# Patient Record
Sex: FEMALE | Race: WHITE | URBAN - METROPOLITAN AREA
[De-identification: names, ages, dates, MRNs, and addresses within clinical notes are randomized per-mention and may not be internally consistent; named-entity substitution may affect disease eponyms.]

---

## 2017-08-04 ENCOUNTER — TRANSFERRED RECORDS (OUTPATIENT)
Dept: HEALTH INFORMATION MANAGEMENT | Facility: CLINIC | Age: 27
End: 2017-08-04

## 2017-09-08 ENCOUNTER — MEDICAL CORRESPONDENCE (OUTPATIENT)
Dept: HEALTH INFORMATION MANAGEMENT | Facility: CLINIC | Age: 27
End: 2017-09-08

## 2017-11-02 ENCOUNTER — OFFICE VISIT (OUTPATIENT)
Dept: OPHTHALMOLOGY | Facility: CLINIC | Age: 27
End: 2017-11-02
Attending: OPHTHALMOLOGY
Payer: COMMERCIAL

## 2017-11-02 DIAGNOSIS — H27.03 APHAKIA OF BOTH EYES: ICD-10-CM

## 2017-11-02 DIAGNOSIS — H40.53X2 SECONDARY GLAUCOMA OF BOTH EYES, MODERATE STAGE: Primary | ICD-10-CM

## 2017-11-02 DIAGNOSIS — J45.20 INTERMITTENT ASTHMA, UNSPECIFIED ASTHMA SEVERITY, UNSPECIFIED WHETHER COMPLICATED: ICD-10-CM

## 2017-11-02 DIAGNOSIS — H40.9 GLAUCOMA: ICD-10-CM

## 2017-11-02 DIAGNOSIS — H55.01 CONGENITAL NYSTAGMUS: ICD-10-CM

## 2017-11-02 PROCEDURE — 92083 EXTENDED VISUAL FIELD XM: CPT | Mod: ZF | Performed by: OPHTHALMOLOGY

## 2017-11-02 PROCEDURE — 99213 OFFICE O/P EST LOW 20 MIN: CPT | Mod: ZF

## 2017-11-02 RX ORDER — TIMOLOL MALEATE 2.5 MG/ML
1 SOLUTION/ DROPS OPHTHALMIC 2 TIMES DAILY
COMMUNITY
End: 2020-07-03

## 2017-11-02 RX ORDER — FLUTICASONE PROPIONATE 50 MCG
1 SPRAY, SUSPENSION (ML) NASAL DAILY
COMMUNITY

## 2017-11-02 RX ORDER — LATANOPROST 50 UG/ML
1 SOLUTION/ DROPS OPHTHALMIC AT BEDTIME
COMMUNITY

## 2017-11-02 ASSESSMENT — PACHYMETRY
OD_CT(UM): 770
OS_CT(UM): 795

## 2017-11-02 ASSESSMENT — TONOMETRY
OD_IOP_MMHG: 26
OD_IOP_MMHG: 26
IOP_METHOD: TONOPEN
OS_IOP_MMHG: 22
OS_IOP_MMHG: 24
IOP_METHOD: APPLANATION

## 2017-11-02 ASSESSMENT — CUP TO DISC RATIO
OD_RATIO: 0.8
OS_RATIO: 0.7

## 2017-11-02 ASSESSMENT — VISUAL ACUITY
CORRECTION_TYPE: CONTACTS
OS_CC: 20/60
OD_CC: 20/80
OS_CC+: -1
METHOD: SNELLEN - LINEAR

## 2017-11-02 ASSESSMENT — CONF VISUAL FIELD
OD_NORMAL: 1
METHOD: COUNTING FINGERS
OS_NORMAL: 1

## 2017-11-02 ASSESSMENT — SLIT LAMP EXAM - LIDS
COMMENTS: NORMAL
COMMENTS: NORMAL

## 2017-11-02 NOTE — NURSING NOTE
Chief Complaints and History of Present Illnesses   Patient presents with     Consult For     HPI    Affected eye(s):  Both   Symptoms:        Frequency:  Constant       Do you have eye pain now?:  No      Comments:  They have been unable to control the pressures avg 40 RE  +dry   Chuyita Landis COT 12:29 PM November 2, 2017

## 2017-11-02 NOTE — MR AVS SNAPSHOT
After Visit Summary   11/2/2017    Tami Allen    MRN: [id number]           Patient Information     Date Of Birth          1990        Visit Information        Provider Department      11/2/2017 12:30 PM Scarlet Navarrete MD [facility]        Today's Diagnoses     Secondary glaucoma of both eyes, moderate stage    -  1    Glaucoma        Aphakia of both eyes        Congenital nystagmus          Care Instructions    Patient will continue on Latanoprost which is a teal top drop at bedtime in BOTH EYES and Simbrinza (Brinzolamide/Brimonidine) which is a white top drop 2x/day (12 hours apart) in BOTH EYES.  A long discussion of the risks, benefits, and alternatives including potential treatment and management options were had with patient and a decision was made to obtain an evaluation with a retina specialist and recheck the IOP prior to proceeding with glaucoma surgery in the right eye.  Patient will also follow up with a Pulmonologist prior to her follow up appointment.  Patient will return to clinic in 3-4 weeks with repeat IOP check.           Follow-ups after your visit        Follow-up notes from your care team     Return 3-4 weeks with repeat IOP check. .      Future tests that were ordered for you today     Open Future Orders        Priority Expected Expires Ordered    DILATED FUNDUS EXAM Routine  11/2/2018 11/2/2017    Pachymetry OU (both eyes) Routine  5/2/2019 11/2/2017            Who to contact     Please call your clinic at [phone] to:    Ask questions about your health    Make or cancel appointments    Discuss your medicines    Learn about your test results    Speak to your doctor   If you have compliments or concerns about an experience at your clinic, or if you wish to file a complaint, please contact [facility] Patient Relations at [phone] or email us at [email or web address]         Additional Information About Your Visit         GelSight Information     GelSight is an electronic gateway that provides easy, online access to your medical records. With GelSight, you can request a clinic appointment, read your test results, renew a prescription or communicate with your care team.     To sign up for GelSight visit the website at [email or web address]   You will be asked to enter the access code listed below, as well as some personal information. Please follow the directions to create your username and password.     Your access code is: [email or web address]  Expires: 2018  6:30 AM     Your access code will  in 90 days. If you need help or a new code, please contact your [facility] or call [phone] for assistance.        Care EveryWhere ID     This is your Care EveryWhere ID. This could be used by other organizations to access your [place] medical records  [id number]         Blood Pressure from Last 3 Encounters:   No data found for BP    Weight from Last 3 Encounters:   No data found for Wt              We Performed the Following     Low Vision Central OU        Primary Care Provider    None Specified       No primary provider on file.        Equal Access to Services     [facility]: Hadii mohan Pappas, minerva hill, qavladislav enciso, estrella lucia . So [facility] [phone].    ATENCIÓN: Si habla español, tiene a stearns disposición servicios gratuitos de asistencia lingüística. Llame al [phone].    We comply with applicable federal civil rights laws and [place] laws. We do not discriminate on the basis of race, color, national origin, age, disability, sex, sexual orientation, or gender identity.            Thank you!     Thank you for choosing EYE CLINIC  for your care. Our goal is always to provide you with excellent care. Hearing back from our patients is one way we can continue to improve our services. Please take a few minutes to complete the  written survey that you may receive in the mail after your visit with us. Thank you!             Your Updated Medication List - Protect others around you: Learn how to safely use, store and throw away your medicines at www.disposemymeds.org.          This list is accurate as of: 11/2/17  1:55 PM.  Always use your most recent med list.                   Brand Name Dispense Instructions for use Diagnosis    brinzolamide-brimonidine 1-0.2 % ophthalmic suspension    SIMBRINZA     1 drop 3 times daily        FLOVENT HFA IN           fluticasone 50 MCG/ACT spray    FLONASE     Spray 1 spray into both nostrils daily        latanoprost 0.005 % ophthalmic solution    XALATAN     1 drop At Bedtime    Secondary glaucoma of both eyes, moderate stage       timolol 0.25 % ophthalmic solution    TIMOPTIC     1 drop 2 times daily

## 2017-11-02 NOTE — PROGRESS NOTES
1)Aphakic/Secondary Glaucoma -- started on gtts in mid 1990s -- K pachy:770/795    Tmax: 38/19    HVF: OD:IN dec sens and OS:Fluct     CDR: 0.8/0.7    HRT/OCT: Unreliable       FHX of Glc: Brother, Sister -- Aphakic Glc -- on gtts, sister had tube surgery, grandmother -- possibly     Gonio:       Intolerant to: T1/2 -- SOB per old notes, asthma attacks per patient     Asthma/COPD: Yes, on inhalers UNABLE to tolerate T1/2   Steroid Use: Yes, po pred     Kidney Stones: No    Sulfa Allergy: No     IOP targets: -- does better with SS5 (LVC) visual field tests -- IOPs uncontrolled OD since April 2017 per patient -- IOP ok on T1/2  -- ?need for PPV at time of tube  2)Aphakia -- s/p CE for congenital cataracts -- wears contacts  3)Nystagmus -- has been wearing contacts since 6 weeks old  4)SHANNON    MD:was tolerating T1/2 in past but notices worsening breathing while on it    Patient will continue on Latanoprost which is a teal top drop at bedtime in BOTH EYES and Simbrinza (Brinzolamide/Brimonidine) which is a white top drop 2x/day (12 hours apart) in BOTH EYES.  A long discussion of the risks, benefits, and alternatives including potential treatment and management options were had with patient and a decision was made to obtain an evaluation with a retina specialist and recheck the IOP prior to proceeding with glaucoma surgery in the right eye.  Patient will also follow up with a Pulmonologist prior to her follow up appointment.  Patient will return to clinic in 3-4 weeks with repeat IOP check.     Attending Physician Attestation:  Complete documentation of historical and exam elements from today's encounter can be found in the full encounter summary report (not reduplicated in this progress note). I personally obtained the chief complaint(s) and history of present illness.  I confirmed and edited as necessary the review of systems, past medical/surgical history, family history, social history, and examination findings as  documented by others; and I examined the patient myself. I personally reviewed the relevant tests, images, and reports as documented above. I formulated and edited as necessary the assessment and plan and discussed the findings and management plan with the patient and family.  - Scarlet Navarrete MD

## 2017-11-02 NOTE — PATIENT INSTRUCTIONS
Patient will continue on Latanoprost which is a teal top drop at bedtime in BOTH EYES and Simbrinza (Brinzolamide/Brimonidine) which is a white top drop 2x/day (12 hours apart) in BOTH EYES.  A long discussion of the risks, benefits, and alternatives including potential treatment and management options were had with patient and a decision was made to obtain an evaluation with a retina specialist and recheck the IOP prior to proceeding with glaucoma surgery in the right eye.  Patient will also follow up with a Pulmonologist prior to her follow up appointment.  Patient will return to clinic in 3-4 weeks with repeat IOP check.

## 2017-11-15 DIAGNOSIS — J45.909 UNCOMPLICATED ASTHMA, UNSPECIFIED ASTHMA SEVERITY, UNSPECIFIED WHETHER PERSISTENT: Primary | ICD-10-CM

## 2017-11-30 ENCOUNTER — OFFICE VISIT (OUTPATIENT)
Dept: OPHTHALMOLOGY | Facility: CLINIC | Age: 27
End: 2017-11-30
Attending: OPHTHALMOLOGY
Payer: COMMERCIAL

## 2017-11-30 DIAGNOSIS — H27.03 APHAKIA OF BOTH EYES: ICD-10-CM

## 2017-11-30 DIAGNOSIS — H55.01 CONGENITAL NYSTAGMUS: ICD-10-CM

## 2017-11-30 DIAGNOSIS — H40.53X2 SECONDARY GLAUCOMA OF BOTH EYES, MODERATE STAGE: Primary | ICD-10-CM

## 2017-11-30 PROCEDURE — 99212 OFFICE O/P EST SF 10 MIN: CPT | Mod: ZF

## 2017-11-30 RX ORDER — METHAZOLAMIDE 50 MG/1
50 TABLET ORAL 3 TIMES DAILY
Qty: 90 TABLET | Refills: 3 | Status: SHIPPED | OUTPATIENT
Start: 2017-11-30

## 2017-11-30 ASSESSMENT — CONF VISUAL FIELD
OD_NORMAL: 1
OS_NORMAL: 1

## 2017-11-30 ASSESSMENT — VISUAL ACUITY
CORRECTION_TYPE: CONTACTS
OD_SC: 20/80
METHOD: SNELLEN - LINEAR
OS_SC: 20/70
OS_SC+: +1

## 2017-11-30 ASSESSMENT — SLIT LAMP EXAM - LIDS
COMMENTS: NORMAL
COMMENTS: NORMAL

## 2017-11-30 ASSESSMENT — TONOMETRY
OS_IOP_MMHG: 24
OD_IOP_MMHG: 36
OD_IOP_MMHG: 35
IOP_METHOD: APPLANATION
OS_IOP_MMHG: 14
IOP_METHOD: TONOPEN

## 2017-11-30 ASSESSMENT — CUP TO DISC RATIO
OD_RATIO: 0.8
OS_RATIO: 0.7

## 2017-11-30 NOTE — PATIENT INSTRUCTIONS
Patient will continue on Latanoprost which is a teal top drop at bedtime in BOTH EYES and Simbrinza (Brinzolamide/Brimonidine) which is a white top drop 2x/day (12 hours apart) in BOTH EYES.  A long discussion of the risks, benefits, and alternatives including potential treatment and management options were had with patient and a decision was made to trial Methazolamide 50mg pills 3x/day while thinking about treatment options. Patient will also follow up with her Pulmonologist and think about options prior to considering tube surgery in the right eye.   Patient will also follow up with a Pulmonologist prior to her follow up appointment.  Patient will return to clinic in 3-4 weeks with repeat IOP check.

## 2017-11-30 NOTE — MR AVS SNAPSHOT
After Visit Summary   11/30/2017    Tami Allen    MRN: [id number]           Patient Information     Date Of Birth          1990        Visit Information        Provider Department      11/30/2017 3:00 PM Scarlet Navarrete MD [facility]        Today's Diagnoses     Secondary glaucoma of both eyes, moderate stage    -  1    Congenital nystagmus        Aphakia of both eyes          Care Instructions    Patient will continue on Latanoprost which is a teal top drop at bedtime in BOTH EYES and Simbrinza (Brinzolamide/Brimonidine) which is a white top drop 2x/day (12 hours apart) in BOTH EYES.  A long discussion of the risks, benefits, and alternatives including potential treatment and management options were had with patient and a decision was made to trial Methazolamide 50mg pills 3x/day while thinking about treatment options. Patient will also follow up with her Pulmonologist and think about options prior to considering tube surgery in the right eye.   Patient will also follow up with a Pulmonologist prior to her follow up appointment.  Patient will return to clinic in 3-4 weeks with repeat IOP check.           Follow-ups after your visit        Follow-up notes from your care team     Return 3-4 weeks with repeat IOP check. .      Your next 10 appointments already scheduled     Jan 04, 2018  2:15 PM CST   RETURN GLAUCOMA with Scarlet Navarrete MD   Eye Clinic ([facility])    [facility] Wil[facility] [address]  [address]  [facility] [id number] [phone]              Who to contact     Please call your clinic at [phone] to:    Ask questions about your health    Make or cancel appointments    Discuss your medicines    Learn about your test results    Speak to your doctor   If you have compliments or concerns about an experience at your clinic, or if you wish to file a complaint, please contact [facility] Patient Relations at [phone]  or email us at [email or web address]         Additional Information About Your Visit        [employer]cal [employer]ric[employer]ge Information     Kidizen is an electronic gateway that provides easy, online access to your medical records. With Kidizen, you can request a clinic appointment, read your test results, renew a prescription or communicate with your care team.     To sign up for Kidizen visit the website at [email or web address]   You will be asked to enter the access code listed below, as well as some personal information. Please follow the directions to create your username and password.     Your access code is: [email or web address]  Expires: 2018  5:30 AM     Your access code will  in 90 days. If you need help or a new code, please contact your [facility] or call [phone] for assistance.        Care EveryWhere ID     This is your Care EveryWhere ID. This could be used by other organizations to access your [place] medical records  [id number]         Blood Pressure from Last 3 Encounters:   No data found for BP    Weight from Last 3 Encounters:   No data found for Wt              Today, you had the following     No orders found for display         Today's Medication Changes          These changes are accurate as of: 17  4:35 PM.  If you have any questions, ask your nurse or doctor.               Start taking these medicines.        Dose/Directions    methazolamide 50 MG tablet   Commonly known as:  NEPTAZANE   Used for:  Secondary glaucoma of both eyes, moderate stage   Started by:  Scarlet Navarrete MD        Dose:  50 mg   Take 1 tablet (50 mg) by mouth 3 times daily   Quantity:  90 tablet   Refills:  3            Where to get your medicines      These medications were sent to [facility] [address] [place] [address] [address] [facility]  [address] [facility] [address]     Phone:  [phone]     methazolamide 50 MG tablet                Primary Care Provider     None Specified       No primary provider on file.        Equal Access to Services     DARINEL HORTON : Hadii aad ku hadcristalcarolina Pappas, mariahkhalif hill, daronchantale purimaestrella coronado. So [facility] [phone].    ATENCIÓN: Si habla español, tiene a stearns disposición servicios gratuitos de asistencia lingüística. [facility] [phone].    We comply with applicable federal civil rights laws and [place] laws. We do not discriminate on the basis of race, color, national origin, age, disability, sex, sexual orientation, or gender identity.            Thank you!     Thank you for choosing EYE CLINIC  for your care. Our goal is always to provide you with excellent care. Hearing back from our patients is one way we can continue to improve our services. Please take a few minutes to complete the written survey that you may receive in the mail after your visit with us. Thank you!             Your Updated Medication List - Protect others around you: Learn how to safely use, store and throw away your medicines at www.disposemymeds.org.          This list is accurate as of: 11/30/17  4:35 PM.  Always use your most recent med list.                   Brand Name Dispense Instructions for use Diagnosis    brinzolamide-brimonidine 1-0.2 % ophthalmic suspension    SIMBRINZA     1 drop 3 times daily        FLOVENT HFA IN           fluticasone 50 MCG/ACT spray    FLONASE     Spray 1 spray into both nostrils daily        latanoprost 0.005 % ophthalmic solution    XALATAN     1 drop At Bedtime    Secondary glaucoma of both eyes, moderate stage       methazolamide 50 MG tablet    NEPTAZANE    90 tablet    Take 1 tablet (50 mg) by mouth 3 times daily    Secondary glaucoma of both eyes, moderate stage       timolol 0.25 % ophthalmic solution    TIMOPTIC     1 drop 2 times daily

## 2017-11-30 NOTE — NURSING NOTE
Chief Complaints and History of Present Illnesses   Patient presents with     Follow Up For     4 week f/u IOP check     HPI    Affected eye(s):  Both   Symptoms:     No blurred vision   No floaters   No flashes   No tearing   No Dryness         Do you have eye pain now?:  No      Comments:  Simbrinza BID OU last used this morning at 630  Latanoprost QHS OU last used 1015 yesterday evening    Xenia Huitron November 30, 2017 3:15 PM

## 2017-11-30 NOTE — PROGRESS NOTES
1)Aphakic/Secondary Glaucoma -- started on gtts in mid 1990s -- K pachy:770/795    Tmax: 38/19    HVF: OD:IN dec sens and OS:Fluct     CDR: 0.8/0.7    HRT/OCT: Unreliable       FHX of Glc: Brother, Sister -- Aphakic Glc -- on gtts, sister had tube surgery, grandmother -- possibly     Gonio:       Intolerant to: T1/2 -- SOB per old notes, asthma attacks per patient (is following with Pulm at ), Diamox and Methazolamide -- fatigue (12/17)     Asthma/COPD: Yes, on inhalers (Phone call: Ok to use selective BB  Per Pulm at  prior to considering incisional surgery )   Steroid Use: Yes, po pred     Kidney Stones: No    Sulfa Allergy: No     IOP targets: -- does better with SS5 (LVC) visual field tests -- IOPs uncontrolled OD since April 2017 per patient -- IOP ok on T1/2  -- ?need for PPV at time of tube  2)Aphakia -- s/p CE for congenital cataracts -- wears contacts  3)Nystagmus -- has been wearing contacts since 6 weeks old  4)SHANNON    MD:was tolerating T1/2 in past with good IOPs but notices worsening breathing while on it    MD:Will need to discuss use of T1/2 with Pulm prior to use -- could consider Betaxolol    MD:Phone call (Dec 2017) had with patient's Pulmonologist who felt selective beta blocker use was low risk given her severity of asthma and felt that would be a safe enough option prior to considering incisional surgery        Patient will continue on Latanoprost which is a teal top drop at bedtime in BOTH EYES and Simbrinza (Brinzolamide/Brimonidine) which is a white top drop 2x/day (12 hours apart) in BOTH EYES.  A long discussion of the risks, benefits, and alternatives including potential treatment and management options were had with patient and a decision was made to trial Methazolamide 50mg pills 3x/day while thinking about treatment options. Patient will also follow up with her Pulmonologist and think about options prior to considering tube surgery in the right eye.   Patient will also follow up with  a Pulmonologist prior to her follow up appointment.  Patient will return to clinic in 3-4 weeks with repeat IOP check.     Attending Physician Attestation:  Complete documentation of historical and exam elements from today's encounter can be found in the full encounter summary report (not reduplicated in this progress note). I personally obtained the chief complaint(s) and history of present illness.  I confirmed and edited as necessary the review of systems, past medical/surgical history, family history, social history, and examination findings as documented by others; and I examined the patient myself. I personally reviewed the relevant tests, images, and reports as documented above. I formulated and edited as necessary the assessment and plan and discussed the findings and management plan with the patient and family.  - Scarlet Navarrete MD     .

## 2017-12-13 ENCOUNTER — TELEPHONE (OUTPATIENT)
Dept: OPHTHALMOLOGY | Facility: CLINIC | Age: 27
End: 2017-12-13

## 2017-12-13 NOTE — TELEPHONE ENCOUNTER
----- Message from Amparo Evans sent at 12/13/2017  1:19 PM CST -----  Regarding: SIde Effects of Med - Dr Navarrete  Contact: [phone]  The pt was prescribed methazolamide (NEPTAZANE) 50 MG tablet and is having side effects from taking it. She states it made her tired, she couldn't walk 4 blocks, etc.  Please call the pt at [phone] and if possible in the next hour, as it is nap time for the kids so she can talk.    Thanks - Amparo    Please DO NOT send this message and/or reply back to sender.  Call Center Representatives DO NOT respond to messages.

## 2017-12-13 NOTE — TELEPHONE ENCOUNTER
Pt states having intolerances to methazolamide as stated in message attached from call center    Left message with direct triage number at [phone] to review further    Note to glaucoma resident for review   Corey Brock RN 3:43 PM 12/13/17

## 2017-12-15 NOTE — TELEPHONE ENCOUNTER
Dr. Camara reviewed and would like to see pt next Thursday with dr. navarrete    Only Dr. Navarrete clinic day until new year    Pt states working and really short staffed would like to decline appt and keep appt first week of new year    Note to dr. Jax Brock RN 3:37 PM 12/15/17

## 2017-12-15 NOTE — TELEPHONE ENCOUNTER
Spoke to pt telephone 12-15-17 at [phone]  States stopped the methazolamide few days ago    Pt states vision was good on it, but body was became really fatigued-- could only work about 5/day then had to sleep  Pt had intolerances to diamox in past also    I previously reviewed with dr. Camara and ok to stop if intolerant    Pt has appt in January    Will forward to dr. Camara to review plan of care with pt     NOTE:  Please call after 1 PM per pt request-- hard to take calls at work     Corey Brock RN 10:19 AM 12/15/17

## 2017-12-22 ENCOUNTER — TELEPHONE (OUTPATIENT)
Dept: OPHTHALMOLOGY | Facility: CLINIC | Age: 27
End: 2017-12-22

## 2017-12-22 NOTE — TELEPHONE ENCOUNTER
----- Message from Reji Loving sent at 12/22/2017  1:05 PM CST -----  Regarding: XALATAN and NEPTAZANE scripts not working for pt  Contact: [phone]  Pt called stating the methazolamide (NEPTAZANE) 50 MG tablet and latanoprost (XALATAN) 0.005 % ophthalmic solution prescriptions are not working for her. Pt's brother switched to TRAVATAN from XALATAN and she said it works better for him so she is wondering if she can switch to that as well. She also stated she stopped taking NEPTAZANE, so if there is any alternative please let pt know. Contact pt at [phone] to discuss these.    Thank you,    Reji Loving  Call Center    Please DO NOT send this message and/or reply back to sender. Call Center Representatives DO NOT respond to messages.

## 2017-12-22 NOTE — TELEPHONE ENCOUNTER
Pt states her brother had better response on travatan than xalatan   If current regimen not working well is it reasonable to try the travatan to see if also has better results (pt aware same class)    Pt aware dr. Navarrete out next week and will forward to Dr. Camara for review and aware not in this Friday afternoon and may be next tues after holiday when able to respond    Pt still off methazolamide since mid december-- I did offer pressure check if would like next week and pt states will keep January 4th appt     Corey Brock RN 1:30 PM 12/22/17

## 2018-01-04 ENCOUNTER — OFFICE VISIT (OUTPATIENT)
Dept: OPHTHALMOLOGY | Facility: CLINIC | Age: 28
End: 2018-01-04
Attending: OPHTHALMOLOGY
Payer: COMMERCIAL

## 2018-01-04 DIAGNOSIS — H55.01 CONGENITAL NYSTAGMUS: ICD-10-CM

## 2018-01-04 DIAGNOSIS — H27.03 APHAKIA OF BOTH EYES: ICD-10-CM

## 2018-01-04 DIAGNOSIS — H40.53X2 SECONDARY GLAUCOMA OF BOTH EYES, MODERATE STAGE: Primary | ICD-10-CM

## 2018-01-04 PROCEDURE — G0463 HOSPITAL OUTPT CLINIC VISIT: HCPCS | Mod: ZF

## 2018-01-04 RX ORDER — BETAXOLOL HYDROCHLORIDE 5 MG/ML
1 SOLUTION/ DROPS OPHTHALMIC 2 TIMES DAILY
Status: DISCONTINUED | OUTPATIENT
Start: 2018-01-04 | End: 2018-01-05

## 2018-01-04 ASSESSMENT — TONOMETRY
IOP_METHOD: APPLANATION
OD_IOP_MMHG: 16
OD_IOP_MMHG: 23
OS_IOP_MMHG: 14
IOP_METHOD: APPLANATION
OS_IOP_MMHG: 18
IOP_METHOD: TONOPEN
OD_IOP_MMHG: 23
OS_IOP_MMHG: 17

## 2018-01-04 ASSESSMENT — VISUAL ACUITY
OS_CC+: -2
METHOD: SNELLEN - LINEAR
CORRECTION_TYPE: CONTACTS
OD_CC+: -1
OS_CC: 20/60
OD_CC: 20/70

## 2018-01-04 ASSESSMENT — CONF VISUAL FIELD
OD_INFERIOR_NASAL_RESTRICTION: 3
OD_INFERIOR_TEMPORAL_RESTRICTION: 3

## 2018-01-04 ASSESSMENT — SLIT LAMP EXAM - LIDS
COMMENTS: NORMAL
COMMENTS: NORMAL

## 2018-01-04 NOTE — PATIENT INSTRUCTIONS
Patient will continue on Latanoprost which is a teal top drop at bedtime in BOTH EYES and Simbrinza (Brinzolamide/Brimonidine) which is a white top drop 2x/day (12 hours apart) in BOTH EYES.  A long discussion of the risks, benefits, and alternatives including potential treatment and management options were had with patient and a decision was made to trial Betaxolol in the morning in the RIGHT EYE ONLY.  Patient will also continue to follow up with her Pulmonologist.  Patient will return to clinic in 1-2 months with repeat IOP check.

## 2018-01-04 NOTE — PROGRESS NOTES
1)Aphakic/Secondary Glaucoma -- started on gtts in mid 1990s -- K pachy:770/795    Tmax: 38/L20s    HVF: OD:IN dec sens and OS:Fluct     CDR: 0.8/0.7    HRT/OCT: Unreliable       FHX of Glc: Brother, Sister -- Aphakic Glc -- on gtts, sister had tube surgery, grandmother -- possibly     Gonio:       Intolerant to: T1/2 -- SOB per old notes, asthma attacks per patient (is following with Pulm at ), Diamox and Methazolamide -- fatigue (12/17)     Asthma/COPD: Yes, on inhalers (Phone call: Ok to use selective BB  Per Pulm at  prior to considering incisional surgery )   Steroid Use: Yes, po pred     Kidney Stones: No    Sulfa Allergy: No     IOP targets: -- does better with SS5 (LVC) visual field tests -- IOPs uncontrolled OD since April 2017 per patient -- IOP ok on T1/2  -- ?need for PPV at time of tube  2)Aphakia -- s/p CE for congenital cataracts -- wears contacts  3)Nystagmus -- has been wearing contacts since 6 weeks old  4)SHANNON    MD:was tolerating T1/2 in past with good IOPs but notices worsening breathing while on it    MD:Will need to discuss use of T1/2 with Pulm prior to use -- could consider Betaxolol    MD:Phone call (Dec 2017) had with patient's Pulmonologist who felt selective beta blocker use was low risk given her severity of asthma and felt that would be a safe enough option prior to considering incisional surgery    MD:1/18 visit:  IOP improved as pt self started T1/2        Patient will continue on Latanoprost which is a teal top drop at bedtime in BOTH EYES and Simbrinza (Brinzolamide/Brimonidine) which is a white top drop 2x/day (12 hours apart) in BOTH EYES.  A long discussion of the risks, benefits, and alternatives including potential treatment and management options were had with patient and a decision was made to trial Betaxolol in the morning in the RIGHT EYE ONLY.  Patient will also continue to follow up with her Pulmonologist.  Patient will return to clinic in 1-2 months with repeat IOP  check.     Attending Physician Attestation:  Complete documentation of historical and exam elements from today's encounter can be found in the full encounter summary report (not reduplicated in this progress note). I personally obtained the chief complaint(s) and history of present illness.  I confirmed and edited as necessary the review of systems, past medical/surgical history, family history, social history, and examination findings as documented by others; and I examined the patient myself. I personally reviewed the relevant tests, images, and reports as documented above. I formulated and edited as necessary the assessment and plan and discussed the findings and management plan with the patient and family.  - Scarlet Navarrete MD       Resident note  Did not tolerate MZM-extreme fatigue and shortness of breath. Currently on timoptic, xalatan and simbrinza both eyes. She denies any breathing issues currently.   Patient refusing incisional surgery.   mCPC may be an option given patient's reticence to incisional surgery.   Would continue timoptic, xalatan and simbrinza both eyes for now.   Return to clinic 2 months repeat [facility] visual field both eyes     Homero Camara MD  PGY3, Dept of Ophthalmology  Pager [phone]

## 2018-01-04 NOTE — NURSING NOTE
Chief Complaints and History of Present Illnesses   Patient presents with     Follow Up For     Secondary glaucoma of both eyes, moderate stage      HPI    Last Eye Exam:  11/30/18   Affected eye(s):  Both   Symptoms:        Unknown duration    Frequency:  Constant       Do you have eye pain now?:  No      Comments:  Tami is here today for a follow up of Secondary glaucoma of both eyes, moderate stage   She feels her vision is a little better than at last visit .  She says her eyes are dry RE>LE.     Danielito Rodrigez COT 2:42 PM January 4, 2018

## 2018-01-04 NOTE — MR AVS SNAPSHOT
After Visit Summary   1/4/2018    Tami Allen    MRN: [id number]           Patient Information     Date Of Birth          1990        Visit Information        Provider Department      1/4/2018 2:15 PM Scarlet Navarrete MD [facility]        Today's Diagnoses     Secondary glaucoma of both eyes, moderate stage    -  1    Aphakia of both eyes        Congenital nystagmus          Care Instructions    Patient will continue on Latanoprost which is a teal top drop at bedtime in BOTH EYES and Simbrinza (Brinzolamide/Brimonidine) which is a white top drop 2x/day (12 hours apart) in BOTH EYES.  A long discussion of the risks, benefits, and alternatives including potential treatment and management options were had with patient and a decision was made to trial Betaxolol in the morning in the RIGHT EYE ONLY.  Patient will also continue to follow up with her Pulmonologist.  Patient will return to clinic in 1-2 months with repeat IOP check.           Follow-ups after your visit        Follow-up notes from your care team     Return 1-2 months with repeat IOP check. .      Your next 10 appointments already scheduled     Mar 06, 2018  1:45 PM CST   RETURN GLAUCOMA with Scarlet Navarrete MD   Eye Clinic ([facility])    [address]  [address]  [address]  [facility] [id number] [phone]              Who to contact     Please call your clinic at [phone] to:    Ask questions about your health    Make or cancel appointments    Discuss your medicines    Learn about your test results    Speak to your doctor   If you have compliments or concerns about an experience at your clinic, or if you wish to file a complaint, please contact [facility] Patient Relations at [phone] or email us at [email or web address]         Additional Information About Your Visit        Explorrahart Information     [employer] is an electronic gateway that provides  easy, online access to your medical records. With [employer], you can request a clinic appointment, read your test results, renew a prescription or communicate with your care team.     To sign up for [employer] visit the website at [email or web address]   You will be asked to enter the access code listed below, as well as some personal information. Please follow the directions to create your username and password.     Your access code is: [email or web address]  Expires: 2018  5:30 AM     Your access code will  in 90 days. If you need help or a new code, please contact your [facility] or call [phone] for assistance.        Care EveryWhere ID     This is your Care EveryWhere ID. This could be used by other organizations to access your [facility] medical records  [id number]         Blood Pressure from Last 3 Encounters:   No data found for BP    Weight from Last 3 Encounters:   No data found for Wt              Today, you had the following     No orders found for display       Primary Care Provider    None Specified       No primary provider on file.        Equal Access to Services     [facility] HospitalELIEZER : Hadii mohan carrillo hadcristalo Sotaylor, waaxda luqadaha, qaybta kaalmakhalif enciso, estrella lucia . So [facility] [phone].    ATENCIÓN: Si habla español, tiene a stearns disposición servicios gratuitos de asistencia lingüística. Llame al [phone].    We comply with applicable federal civil rights laws and [place] laws. We do not discriminate on the basis of race, color, national origin, age, disability, sex, sexual orientation, or gender identity.            Thank you!     Thank you for choosing EYE CLINIC  for your care. Our goal is always to provide you with excellent care. Hearing back from our patients is one way we can continue to improve our services. Please take a few minutes to complete the written survey that you may receive in the mail after your visit with  us. Thank you!             Your Updated Medication List - Protect others around you: Learn how to safely use, store and throw away your medicines at www.disposemymeds.org.          This list is accurate as of: 1/4/18  4:58 PM.  Always use your most recent med list.                   Brand Name Dispense Instructions for use Diagnosis    brinzolamide-brimonidine 1-0.2 % ophthalmic suspension    SIMBRINZA     1 drop 3 times daily        FLOVENT HFA IN           fluticasone 50 MCG/ACT spray    FLONASE     Spray 1 spray into both nostrils daily        latanoprost 0.005 % ophthalmic solution    XALATAN     1 drop At Bedtime    Secondary glaucoma of both eyes, moderate stage       methazolamide 50 MG tablet    NEPTAZANE    90 tablet    Take 1 tablet (50 mg) by mouth 3 times daily    Secondary glaucoma of both eyes, moderate stage       timolol 0.25 % ophthalmic solution    TIMOPTIC     1 drop 2 times daily

## 2018-03-06 ENCOUNTER — OFFICE VISIT (OUTPATIENT)
Dept: OPHTHALMOLOGY | Facility: CLINIC | Age: 28
End: 2018-03-06
Attending: OPHTHALMOLOGY

## 2018-03-06 DIAGNOSIS — H27.03 APHAKIA OF BOTH EYES: ICD-10-CM

## 2018-03-06 DIAGNOSIS — H40.53X2 SECONDARY GLAUCOMA OF BOTH EYES, MODERATE STAGE: Primary | ICD-10-CM

## 2018-03-06 PROCEDURE — G0463 HOSPITAL OUTPT CLINIC VISIT: HCPCS | Mod: ZF

## 2018-03-06 ASSESSMENT — VISUAL ACUITY
OD_CC: 20/70
OS_CC+: -2
METHOD: SNELLEN - LINEAR
OS_CC: 20/60

## 2018-03-06 ASSESSMENT — CONF VISUAL FIELD
OD_INFERIOR_NASAL_RESTRICTION: 3
OD_INFERIOR_TEMPORAL_RESTRICTION: 3

## 2018-03-06 ASSESSMENT — TONOMETRY
IOP_METHOD: TONOPEN
OS_IOP_MMHG: 13
IOP_METHOD: APPLANATION
OD_IOP_MMHG: 17
OS_IOP_MMHG: 13
OD_IOP_MMHG: 22

## 2018-03-06 ASSESSMENT — SLIT LAMP EXAM - LIDS
COMMENTS: NORMAL
COMMENTS: NORMAL

## 2018-03-06 NOTE — NURSING NOTE
Chief Complaints and History of Present Illnesses   Patient presents with     Follow Up For     Secondary glaucoma of both eyes, moderate stage      HPI    Last Eye Exam:  1/4/18   Affected eye(s):  Both   Symptoms:        Unknown duration    Frequency:  Constant       Do you have eye pain now?:  No      Comments:  Tami is here today for a follow up of Secondary glaucoma of both eyes, moderate stage   She says she is having a bad vision day. On her was to this office, something got into her RE causing it to burn. She took her contact out, but RE still burning and irritated. Over this past weekend she feels she may have scratched her LE. She says she was unable to wear thew LE contact over the weekend because of the discomfort    She says she has been using the Simbrinza, Latanoprost, and timoptic as directed. She was supposed to trial Betaxolol but she doesn't thing it was ever called in      Danielito Rodrigez COT 2:01 PM March 6, 2018

## 2018-03-06 NOTE — PROGRESS NOTES
1)Aphakic/Secondary Glaucoma -- started on gtts in mid 1990s -- K pachy:770/795    Tmax: 38/L20s    HVF: OD:IN dec sens and OS:Fluct     CDR: 0.8/0.7    HRT/OCT: Unreliable       FHX of Glc: Brother, Sister -- Aphakic Glc -- on gtts, sister had tube surgery, grandmother -- possibly     Gonio:       Intolerant to: T1/2 -- SOB per old notes, asthma attacks per patient (is following with Pulm at ), Diamox and Methazolamide -- fatigue (12/17)     Asthma/COPD: Yes, on inhalers (Phone call: Ok to use selective BB  Per Pulm at  prior to considering incisional surgery )   Steroid Use: Yes, po pred     Kidney Stones: No    Sulfa Allergy: No     IOP targets: -- does better with SS5 ([facility]) visual field tests -- IOPs uncontrolled OD since April 2017 per patient -- IOP ok on T1/2  -- ?need for PPV at time of tube  2)Aphakia -- s/p CE for congenital cataracts -- wears contacts  3)Nystagmus -- has been wearing contacts since 6 weeks old  4)SHANNON    MD:was tolerating T1/2 in past with good IOPs but notices worsening breathing while on it    MD:Will need to discuss use of T1/2 with Pulm prior to use -- could consider Betaxolol    MD:Phone call (Dec 2017) had with patient's Pulmonologist who felt selective beta blocker use was low risk given her severity of asthma and felt that would be a safe enough option prior to considering incisional surgery    MD:1/18 visit:  IOP improved as pt self started T1/2 -- SOB fine per patient on 3/18 -- appt with Pulm in summer 2018      Patient will continue on Latanoprost which is a teal top drop at bedtime in BOTH EYES and Simbrinza (Brinzolamide/Brimonidine) which is a white top drop 2x/day (12 hours apart) in BOTH EYES and Timolol which is a yellow top drop in the morning in BOTH EYES.  Patient will also continue to follow up with her Pulmonologist.  Patient will return to clinic in 4-6 months with repeat IOP check and visual field test (OU:LVC).     Attending Physician Attestation:  Complete  documentation of historical and exam elements from today's encounter can be found in the full encounter summary report (not reduplicated in this progress note). I personally obtained the chief complaint(s) and history of present illness.  I confirmed and edited as necessary the review of systems, past medical/surgical history, family history, social history, and examination findings as documented by others; and I examined the patient myself. I personally reviewed the relevant tests, images, and reports as documented above. I formulated and edited as necessary the assessment and plan and discussed the findings and management plan with the patient and family.  - Scarlet Navarrete MD

## 2018-03-06 NOTE — PATIENT INSTRUCTIONS
Patient will continue on Latanoprost which is a teal top drop at bedtime in BOTH EYES and Simbrinza (Brinzolamide/Brimonidine) which is a white top drop 2x/day (12 hours apart) in BOTH EYES and Timolol which is a yellow top drop in the morning in BOTH EYES.  Patient will also continue to follow up with her Pulmonologist.  Patient will return to clinic in 4-6 months with repeat IOP check and visual field test (OU:LVC).

## 2018-03-06 NOTE — MR AVS SNAPSHOT
After Visit Summary   3/6/2018    Tami Allen    MRN: [id number]           Patient Information     Date Of Birth          1990        Visit Information        Provider Department      3/6/2018 1:45 PM Scarlet Navarrete MD [facility]        Today's Diagnoses     Secondary glaucoma of both eyes, moderate stage    -  1    Aphakia of both eyes          Care Instructions    Patient will continue on Latanoprost which is a teal top drop at bedtime in BOTH EYES and Simbrinza (Brinzolamide/Brimonidine) which is a white top drop 2x/day (12 hours apart) in BOTH EYES and Timolol which is a yellow top drop in the morning in BOTH EYES.  Patient will also continue to follow up with her Pulmonologist.  Patient will return to clinic in 4-6 months with repeat IOP check and visual field test (OU:LVC).           Follow-ups after your visit        Follow-up notes from your care team     Return  4-6 months with repeat IOP check and visual field test (OU:LVC). .      Who to contact     Please call your clinic at [phone] to:    Ask questions about your health    Make or cancel appointments    Discuss your medicines    Learn about your test results    Speak to your doctor            Additional Information About Your Visit        MyChart Information     [employer] is an electronic gateway that provides easy, online access to your medical records. With [employer], you can request a clinic appointment, read your test results, renew a prescription or communicate with your care team.     To sign up for [employer] visit the website at [email or web address]   You will be asked to enter the access code listed below, as well as some personal information. Please follow the directions to create your username and password.     Your access code is: [email or web address]  Expires: 2018  6:30 AM     Your access code will  in 90 days. If you need help or a new code, please contact your [facility]  or call [phone] for assistance.        Care EveryWhere ID     This is your Care EveryWhere ID. This could be used by other organizations to access your [facility] medical records  [id number]         Blood Pressure from Last 3 Encounters:   No data found for BP    Weight from Last 3 Encounters:   No data found for Wt              Today, you had the following     No orders found for display         Today's Medication Changes          These changes are accurate as of 3/6/18  2:53 PM.  If you have any questions, ask your nurse or doctor.               These medicines have changed or have updated prescriptions.        Dose/Directions    * brinzolamide-brimonidine 1-0.2 % ophthalmic suspension   Commonly known as:  SIMBRINZA   This may have changed:  Another medication with the same name was added. Make sure you understand how and when to take each.   Changed by:  Scarlet Navarrete MD        Dose:  1 drop   1 drop 3 times daily   Refills:  0       * brinzolamide-brimonidine 1-0.2 % ophthalmic suspension   Commonly known as:  SIMBRINZA   This may have changed:  You were already taking a medication with the same name, and this prescription was added. Make sure you understand how and when to take each.   Used for:  Secondary glaucoma of both eyes, moderate stage   Changed by:  Scarlet Navarrete MD        Dose:  1 drop   Place 1 drop into both eyes 3 times daily   Quantity:  8 mL   Refills:  11       * Notice:  This list has 2 medication(s) that are the same as other medications prescribed for you. Read the directions carefully, and ask your doctor or other care provider to review them with you.         Where to get your medicines      Some of these will need a paper prescription and others can be bought over the counter.  Ask your nurse if you have questions.     Bring a paper prescription for each of these medications     brinzolamide-brimonidine 1-0.2 % ophthalmic suspension                Primary Care Provider     None Specified       No primary provider on file.        Equal Access to Services     DARINEL HORTON : Hadii aad ku hadcristalcarolina Jeanali, watoneyda claricejavierha, qafishta gaylawendyestrella coronadoarniemiguel valdes. So [facility] [phone].    ATENCIÓN: Si habla español, tiene a stearns disposición servicios gratuitos de asistencia lingüística. [facility] [phone].    We comply with applicable federal civil rights laws and [place] laws. We do not discriminate on the basis of race, color, national origin, age, disability, sex, sexual orientation, or gender identity.            Thank you!     Thank you for choosing EYE CLINIC  for your care. Our goal is always to provide you with excellent care. Hearing back from our patients is one way we can continue to improve our services. Please take a few minutes to complete the written survey that you may receive in the mail after your visit with us. Thank you!             Your Updated Medication List - Protect others around you: Learn how to safely use, store and throw away your medicines at www.disposemymeds.org.          This list is accurate as of 3/6/18  2:53 PM.  Always use your most recent med list.                   Brand Name Dispense Instructions for use Diagnosis    * brinzolamide-brimonidine 1-0.2 % ophthalmic suspension    SIMBRINZA     1 drop 3 times daily        * brinzolamide-brimonidine 1-0.2 % ophthalmic suspension    SIMBRINZA    8 mL    Place 1 drop into both eyes 3 times daily    Secondary glaucoma of both eyes, moderate stage       FLOVENT HFA IN           fluticasone 50 MCG/ACT spray    FLONASE     Spray 1 spray into both nostrils daily        latanoprost 0.005 % ophthalmic solution    XALATAN     1 drop At Bedtime    Secondary glaucoma of both eyes, moderate stage       methazolamide 50 MG tablet    NEPTAZANE    90 tablet    Take 1 tablet (50 mg) by mouth 3 times daily    Secondary glaucoma of both eyes, moderate stage       timolol 0.25 % ophthalmic  solution    TIMOPTIC     1 drop 2 times daily        * Notice:  This list has 2 medication(s) that are the same as other medications prescribed for you. Read the directions carefully, and ask your doctor or other care provider to review them with you.

## 2018-04-27 ENCOUNTER — TELEPHONE (OUTPATIENT)
Dept: OPHTHALMOLOGY | Facility: CLINIC | Age: 28
End: 2018-04-27

## 2018-04-27 NOTE — TELEPHONE ENCOUNTER
Dr. Navarrete, Request sample RX for brinzolamide-brimonidine (SIMBRINZA) 1-0.2 % ophthalmic suspension  Received: Today       [facility]       Phone Number: [phone]                     Hi Team,     Patient of Dr. Navarrete, called to request a sample RX for brinzolamide-brimonidine (SIMBRINZA) 1-0.2 % ophthalmic suspension. Current eye drop is not covered on new insurance. Patient needs to get a sample to cover her until she can get pre-auth. Patient needs this RX today. Will have someone else pick this up. Please call OK to leave a message.         --  Message received by triage at 1116    Left message at 1206  Stated no samples available at clinic  Asked to contact me about alternate eye drops until prior authorization goes thru-- splitting up simbrinza eye drops  Corey Brock RN 12:07 PM 04/27/18

## 2018-05-01 ENCOUNTER — TELEPHONE (OUTPATIENT)
Dept: OPHTHALMOLOGY | Facility: CLINIC | Age: 28
End: 2018-05-01

## 2018-05-01 DIAGNOSIS — H40.53X2 SECONDARY GLAUCOMA OF BOTH EYES, MODERATE STAGE: ICD-10-CM

## 2018-05-01 NOTE — TELEPHONE ENCOUNTER
Health Call Center    Phone Message    May a detailed message be left on voicemail: yes    Reason for Call: Other: Pt is returning a phone call in regards to 2 issues. First, Pt wants to f/u on prior auth for Rx although Pt was not able to provide us with updated insurance information. 2nd, Pt wants to talk about splitting up Rx Simbrinza for cost and insurance purposes and because the pharmacy does not have it. Pt will f/u with insurance again. Please call Pt back to advise.       Action Taken: Message routed to:  [facility]

## 2018-05-01 NOTE — TELEPHONE ENCOUNTER
----- Message from TAE Bingham sent at 4/30/2018  8:49 AM CDT -----  Regarding: FW: Eye medication  Contact: [phone]   Left message  ----- Message -----     From: Baylee Kathleen     Sent: 4/27/2018   5:35 PM       To: [facility]  Subject: Eye medication                                   Pt Ph # [phone], Pt of Dr Navarrete,    Pt requests that Dr Navarrete start Prior Auth for her medication, her insurance needs letter of medical necessity or a call to [phone], it has to be processed as urgent please in order for them to get to it, she said to say that when you call,    Please call her back about alternate medication in meantime, since no samples,    She said her new insurance just went into affect Monday, she hasn't received any cards, she thinks she is covered by [employer], she has no further information about her insurance name or id or group etc,    Thank you,  Baylee  Call Center    Please DO NOT send this message and/or reply back to sender.  Call Center Representatives DO NOT respond to messages.

## 2018-05-01 NOTE — TELEPHONE ENCOUNTER
Sandra Soler, COA  P [facility]        Phone Number: [phone]                      Left message            Previous Messages       ----- Message -----      From: Baylee Kathleen      Sent: 4/27/2018   5:35 PM        To: [facility]   Subject: Eye medication                                   Pt Ph # [phone], Pt of Dr Navarrete,     Pt requests that Dr Navarrete start Prior Auth for her medication, her insurance needs letter of medical necessity or a call to [phone], it has to be processed as urgent please in order for them to get to it, she said to say that when you call,     Please call her back about alternate medication in meantime, since no samples,     She said her new insurance just went into affect Monday, she hasn't received any cards, she thinks she is covered by [employer], she has no further information about her insurance name or id or group etc,            --  Message above received by triage  Note to Prior authorization team to initiate prior authorization for simbrinza    Note to glaucoma resident to review options until approved  Corey Brock RN 7:52 AM 05/01/18

## 2018-05-02 RX ORDER — BRINZOLAMIDE 10 MG/ML
1 SUSPENSION/ DROPS OPHTHALMIC 2 TIMES DAILY
Qty: 1 BOTTLE | Refills: 11 | Status: SHIPPED | OUTPATIENT
Start: 2018-05-02

## 2018-05-02 RX ORDER — BRIMONIDINE TARTRATE 2 MG/ML
1 SOLUTION/ DROPS OPHTHALMIC 2 TIMES DAILY
Qty: 1 BOTTLE | Refills: 11 | Status: SHIPPED | OUTPATIENT
Start: 2018-05-02

## 2018-05-02 NOTE — TELEPHONE ENCOUNTER
Ok to split simbrinza up per dr. Loya    New Rx's sent to pt pharmacy-- [facility]    Left message Rx were sent  Direct number provided for further assistance    Reviewed brinzolamide may also need PA-- pt to use brimonidine in meantime    Will contact pt once insurance approves/denies simbrinza    Corey Brock RN 10:33 AM 05/02/18

## 2018-05-02 NOTE — TELEPHONE ENCOUNTER
Central Prior Authorization Team   Phone: [phone]      PA Initiation    Medication: brinzolamide-brimonidine (SIMBRINZA) 1-0.2 % ophthalmic suspension  Insurance Company: [employer] - Phone [phone] Fax [phone]  Pharmacy Filling the Rx: [facility] [address] [place] [address] DRIVE  Filling Pharmacy Phone: [phone]  Filling Pharmacy Fax:    Start Date: 5/2/2018

## 2018-05-03 NOTE — TELEPHONE ENCOUNTER
Prior Authorization Approval    Authorization Effective Date: 5/2/2018  Authorization Expiration Date: 5/2/2019  Medication: brinzolamide-brimonidine (SIMBRINZA) 1-0.2 % ophthalmic suspension- [place] APPROVED  Approved Dose/Quantity:   Reference #:     Insurance Company: [employer] - Phone [phone] Fax [phone]  Expected CoPay:       CoPay Card Available:      Foundation Assistance Needed:    Which Pharmacy is filling the prescription (Not needed for infusion/clinic administered): [facility] [address] [place] [address]  Pharmacy Notified: Yes  Patient Notified: Yes

## 2019-08-22 ENCOUNTER — TELEPHONE (OUTPATIENT)
Dept: OPHTHALMOLOGY | Facility: CLINIC | Age: 29
End: 2019-08-22

## 2019-08-22 DIAGNOSIS — H40.53X2 SECONDARY GLAUCOMA OF BOTH EYES, MODERATE STAGE: ICD-10-CM

## 2019-08-22 RX ORDER — TIMOLOL MALEATE 5 MG/ML
1 SOLUTION/ DROPS OPHTHALMIC EVERY MORNING
Qty: 1 BOTTLE | Refills: 3 | Status: SHIPPED | OUTPATIENT
Start: 2019-08-22 | End: 2020-07-03

## 2019-08-22 NOTE — TELEPHONE ENCOUNTER
Timolol 1/2 and simbrinza renewed    Ok per pulmonologist to use beta blocker (T1/2 = Timolol 0.5%) per previous epic note  Corey Brock RN RN 1:22 PM 08/22/19        [facility] Center    Phone Message    May a detailed message be left on voicemail: yes    Reason for Call: Medication Question or concern regarding medication   Prescription Clarification  Name of Medication: SIMBRINZA, TIMOPTIC  Prescribing Provider: LANDON   Pharmacy: [facility] [id number] [facility] [address]   What on the order needs clarification? Pt requesting for these rx asap. Pt already made a f/u appt with Landon. Please call back to let pt know once completed.     Action Taken: Message routed to:  [facility] eye

## 2020-07-03 ENCOUNTER — TELEPHONE (OUTPATIENT)
Dept: OPHTHALMOLOGY | Facility: CLINIC | Age: 30
End: 2020-07-03

## 2020-07-03 DIAGNOSIS — H40.53X2 SECONDARY GLAUCOMA OF BOTH EYES, MODERATE STAGE: ICD-10-CM

## 2020-07-03 RX ORDER — TIMOLOL MALEATE 5 MG/ML
1 SOLUTION/ DROPS OPHTHALMIC EVERY MORNING
Qty: 1 BOTTLE | Refills: 3 | Status: SHIPPED | OUTPATIENT
Start: 2020-07-03

## 2020-07-03 NOTE — TELEPHONE ENCOUNTER
[facility] Call Center    Phone Message    May a detailed message be left on voicemail: yes     Reason for Call: Medication Refill Request    Has the patient contacted the pharmacy for the refill? Yes   Name of medication being requested: timolol maleate (TIMOPTIC) 0.5 % ophthalmic solution, brinzolamide-brimonidine (SIMBRINZA) 1-0.2 % ophthalmic suspension   Provider who prescribed the medication: Dr Navarrete  Pharmacy: [facility] [id number] [place] [address] [id number]  Date medication is needed: ASAP   Pt changing Pharmacy's      Action Taken: Message routed to:  [facility] eye    Travel Screening: Not Applicable

## 2020-07-21 ENCOUNTER — OFFICE VISIT (OUTPATIENT)
Dept: OPHTHALMOLOGY | Facility: CLINIC | Age: 30
End: 2020-07-21
Attending: OPHTHALMOLOGY

## 2020-07-21 DIAGNOSIS — H55.01 CONGENITAL NYSTAGMUS: ICD-10-CM

## 2020-07-21 DIAGNOSIS — H27.03 APHAKIA OF BOTH EYES: ICD-10-CM

## 2020-07-21 DIAGNOSIS — H40.53X2 SECONDARY GLAUCOMA OF BOTH EYES, MODERATE STAGE: Primary | ICD-10-CM

## 2020-07-21 PROCEDURE — 92083 EXTENDED VISUAL FIELD XM: CPT | Mod: ZF | Performed by: OPHTHALMOLOGY

## 2020-07-21 PROCEDURE — G0463 HOSPITAL OUTPT CLINIC VISIT: HCPCS | Mod: ZF

## 2020-07-21 ASSESSMENT — TONOMETRY
OD_IOP_MMHG: 22
IOP_METHOD: APPLANATION
OS_IOP_MMHG: 16

## 2020-07-21 ASSESSMENT — VISUAL ACUITY
OD_PH_CC: 20/80-1
METHOD: SNELLEN - LINEAR
OS_CC: 20/70
OS_CC+: -2
OD_CC: 20/100

## 2020-07-21 ASSESSMENT — REFRACTION_WEARINGRX
OS_SPHERE: +22.00
SPECS_TYPE: CONTACT LENS
OD_SPHERE: +16.00

## 2020-07-21 ASSESSMENT — CONF VISUAL FIELD
METHOD: COUNTING FINGERS
OD_INFERIOR_NASAL_RESTRICTION: 3
OS_NORMAL: 1

## 2020-07-21 ASSESSMENT — SLIT LAMP EXAM - LIDS
COMMENTS: NORMAL
COMMENTS: NORMAL

## 2020-07-21 NOTE — PROGRESS NOTES
1)Aphakic/Secondary Glaucoma -- ?prog in 2020 after pt was off drops in summer of 2019 2/2 insurance issues, started on gtts in mid 1990s -- K pachy:770/795    Tmax: 38/L20s    HVF: OD:IN dec sens and OS:Fluct     CDR: 0.8/0.7    HRT/OCT: Unreliable       FHX of Glc: Brother, Sister -- Aphakic Glc -- on gtts, sister had tube surgery, grandmother -- possibly     Gonio:       Intolerant to: T1/2 -- SOB per old notes, asthma attacks per patient (is following with Pulm at ), Diamox and Methazolamide -- fatigue (12/17)     Asthma/COPD: Yes, on inhalers (Phone call: Ok to use selective BB  Per Pulm at  prior to considering incisional surgery )   Steroid Use: Yes, po pred     Kidney Stones: No    Sulfa Allergy: No     IOP targets: -- does better with SS5 (LVC) visual field tests -- IOPs uncontrolled OD since April 2017 per patient -- IOP ok on T1/2  -- ?need for PPV at time of tube  2)Aphakia -- s/p CE for congenital cataracts -- wears contacts -- baseline VA OD:20/80 and OS:20/60  3)Nystagmus -- has been wearing contacts since 6 weeks old  4)SHANNON     MD:was tolerating T1/2 in past with good IOPs but notices worsening breathing while on it     MD:Will need to discuss use of T1/2 with Pulm prior to use -- could consider Betaxolol     MD:Phone call (Dec 2017) had with patient's Pulmonologist who felt selective beta blocker use was low risk given her severity of asthma and felt that would be a safe enough option prior to considering incisional surgery     MD:1/18 visit:  IOP improved as pt self started T1/2 -- SOB fine per patient on 3/18 -- appt with Pulm in summer 2018    MD:pt was off drops for a few months in the summer 2019 2/2 insurance issues -- ??etiology of prog -- HVF OD:early Inf arcuate with SN step and OS:inf paracentral dec sens         Patient will continue on Latanoprost which is a teal top drop at bedtime in BOTH EYES and Simbrinza (Brinzolamide/Brimonidine) which is a white top drop 2x/day (12 hours  apart) in BOTH EYES and Timolol which is a yellow top drop in the morning in BOTH EYES.  Patient will also continue to follow up with her Pulmonologist.  Patient will return to clinic in 3-4 months with repeat IOP check and visual field test (OU:LVC) and dilated eye exam.     Attending Physician Attestation:  Complete documentation of historical and exam elements from today's encounter can be found in the full encounter summary report (not reduplicated in this progress note). I personally obtained the chief complaint(s) and history of present illness.  I confirmed and edited as necessary the review of systems, past medical/surgical history, family history, social history, and examination findings as documented by others; and I examined the patient myself. I personally reviewed the relevant tests, images, and reports as documented above. I formulated and edited as necessary the assessment and plan and discussed the findings and management plan with the patient and family.  - Scarlet Navarrete MD

## 2020-07-21 NOTE — NURSING NOTE
Chief Complaints and History of Present Illnesses   Patient presents with     Glaucoma Follow-Up     Chief Complaint(s) and History of Present Illness(es)     Glaucoma Follow-Up     Laterality: right eye    Associated symptoms: dryness.  Negative for floaters and flashes    Treatment side effects: irritation    Compliance with Treatment: always    Pain scale: 1/10              Comments     Glaucoma follow up.    She may have scratched her right eye last night.  She uses Simbrinza twice daily, Timolol in the morning and Xalatan at night in both eyes.  She is using a Homeopathic Allergy drop.  Sandra Soler, COA, COA 9:29 AM 07/21/2020

## 2020-07-21 NOTE — PATIENT INSTRUCTIONS
Patient will continue on Latanoprost which is a teal top drop at bedtime in BOTH EYES and Simbrinza (Brinzolamide/Brimonidine) which is a white top drop 2x/day (12 hours apart) in BOTH EYES and Timolol which is a yellow top drop in the morning in BOTH EYES.  Patient will also continue to follow up with her Pulmonologist.  Patient will return to clinic in 3-4 months with repeat IOP check and visual field test (OU:LVC) and dilated eye exam.